# Patient Record
(demographics unavailable — no encounter records)

---

## 2025-05-16 NOTE — HISTORY OF PRESENT ILLNESS
[FreeTextEntry1] : CPE [de-identified] : hasn't been on birth control for quite a while but used to be on for quite a while  thyroid cyst history - told to follow-up ultrasound in 2-3 years, this was several years ago  speaks with therapy once a week - may have eating issues, has lost weight unintentionally within the past year 2/2 decreased appetite from mental health concerns

## 2025-05-16 NOTE — HEALTH RISK ASSESSMENT
[Yes] : Yes [2 - 4 times a month (2 pts)] : 2-4 times a month (2 points) [1 or 2 (0 pts)] : 1 or 2 (0 points) [Never (0 pts)] : Never (0 points) [Current] : Current [0-4] : 0-4 [0] : 2) Feeling down, depressed, or hopeless: Not at all (0) [PHQ-2 Negative - No further assessment needed] : PHQ-2 Negative - No further assessment needed [Employed] : employed [Sexually Active] : sexually active [Very Good] : ~his/her~  mood as very good [Patient reported PAP Smear was normal] : Patient reported PAP Smear was normal [Audit-CScore] : 2 [de-identified] : weight lifting, pilates - 3 times a week [de-identified] : occasional soda, some fried foods/fast food [[id number]] : 0 [PapSmearDate] : 2024 [FreeTextEntry2] : [employer] [de-identified] : 1-2 cigarettes a day for the past year

## 2025-05-16 NOTE — PHYSICAL EXAM
[Normal Sclera/Conjunctiva] : normal sclera/conjunctiva [EOMI] : extraocular movements intact [Normal] : normal rate, regular rhythm, normal S1 and S2 and no murmur heard [No Varicosities] : no varicosities [No Edema] : there was no peripheral edema [Soft] : abdomen soft [Non Tender] : non-tender [Non-distended] : non-distended [No HSM] : no HSM [Normal Posterior Cervical Nodes] : no posterior cervical lymphadenopathy [Normal Anterior Cervical Nodes] : no anterior cervical lymphadenopathy [Coordination Grossly Intact] : coordination grossly intact [No Focal Deficits] : no focal deficits [Normal Gait] : normal gait [Normal Affect] : the affect was normal [Normal Insight/Judgement] : insight and judgment were intact [de-identified] : appears underweight